# Patient Record
Sex: FEMALE | Race: WHITE | URBAN - METROPOLITAN AREA
[De-identification: names, ages, dates, MRNs, and addresses within clinical notes are randomized per-mention and may not be internally consistent; named-entity substitution may affect disease eponyms.]

---

## 2018-10-09 ENCOUNTER — OFFICE VISIT (OUTPATIENT)
Dept: URBAN - METROPOLITAN AREA CLINIC 76 | Facility: CLINIC | Age: 68
End: 2018-10-09
Payer: MEDICARE

## 2018-10-09 DIAGNOSIS — H02.056 TRICHIASIS W/O ENTROPION OF LEFT EYELID: ICD-10-CM

## 2018-10-09 PROCEDURE — 92014 COMPRE OPH EXAM EST PT 1/>: CPT | Performed by: OPTOMETRIST

## 2018-10-09 ASSESSMENT — VISUAL ACUITY
OS: 20/25
OD: 20/25

## 2018-10-09 ASSESSMENT — INTRAOCULAR PRESSURE
OD: 16
OS: 15

## 2018-10-09 NOTE — IMPRESSION/PLAN
Impression: Diagnosis: Age-related nuclear cataract, bilateral. Code: H25.13. Plan: Discussed condition. Continue to monitor without treatment at this time.

## 2018-10-09 NOTE — IMPRESSION/PLAN
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 Removed 2 lashes with forceps at slit lamp. Plan: Discussed. Continue to monitor.

## 2019-10-30 ENCOUNTER — OFFICE VISIT (OUTPATIENT)
Dept: URBAN - METROPOLITAN AREA CLINIC 76 | Facility: CLINIC | Age: 69
End: 2019-10-30
Payer: COMMERCIAL

## 2019-10-30 DIAGNOSIS — H25.13 NUCLEAR SCLEROSIS CATARACT, BILATERAL: ICD-10-CM

## 2019-10-30 DIAGNOSIS — H52.4 PRESBYOPIA: Primary | ICD-10-CM

## 2019-10-30 PROCEDURE — 92014 COMPRE OPH EXAM EST PT 1/>: CPT | Performed by: OPTOMETRIST

## 2019-10-30 ASSESSMENT — KERATOMETRY
OS: 44.13
OD: 44.00

## 2019-10-30 ASSESSMENT — VISUAL ACUITY
OD: 20/20
OS: 20/20

## 2019-10-30 ASSESSMENT — INTRAOCULAR PRESSURE
OS: 14
OD: 14

## 2021-10-26 ENCOUNTER — OFFICE VISIT (OUTPATIENT)
Dept: URBAN - METROPOLITAN AREA CLINIC 76 | Facility: CLINIC | Age: 71
End: 2021-10-26
Payer: COMMERCIAL

## 2021-10-26 DIAGNOSIS — H35.3121 NONEXUDATIVE AGE-RELATED MACULAR DEGENERATION OF LEFT EYE, EARLY DRY STAGE: ICD-10-CM

## 2021-10-26 DIAGNOSIS — H25.813 COMBINED FORMS OF AGE-RELATED CATARACT, BILATERAL: ICD-10-CM

## 2021-10-26 DIAGNOSIS — H43.813 VITREOUS DEGENERATION, BILATERAL: ICD-10-CM

## 2021-10-26 DIAGNOSIS — H04.123 DRY EYE SYNDROME OF BILATERAL LACRIMAL GLANDS: ICD-10-CM

## 2021-10-26 PROCEDURE — 92014 COMPRE OPH EXAM EST PT 1/>: CPT | Performed by: OPTOMETRIST

## 2021-10-26 ASSESSMENT — INTRAOCULAR PRESSURE
OS: 10
OD: 10

## 2021-10-26 ASSESSMENT — VISUAL ACUITY
OD: 20/20
OS: 20/25

## 2021-10-26 ASSESSMENT — KERATOMETRY
OS: 44.38
OD: 43.63

## 2021-10-26 NOTE — IMPRESSION/PLAN
Impression: Vitreous degeneration, bilateral: H43.813. Bilateral. Plan: Posterior vitreous detachment accounts for the patient's complaints. There is no evidence of associated retinal pathology. All signs and risks of retinal detachment and tears were discussed. Patient instructed to call the office immediately if any symptoms noted.

## 2021-10-26 NOTE — IMPRESSION/PLAN
Impression: Combined forms of age-related cataract, bilateral: H25.813. Bilateral. Plan: Cataracts affecting vision some, but no surgery is currently recommended. The patient will monitor vision changes and contact us with any decrease in vision. Continue to monitor.

## 2021-10-26 NOTE — IMPRESSION/PLAN
Impression: Nonexudative age-related macular degeneration of left eye, early dry stage: H35.3121. Left. Plan: AMD: Discussed condition in detail. AMD education given. Call if symptoms worsen. Will continue to monitor. Vista supplements ordered for pt.

## 2021-10-26 NOTE — IMPRESSION/PLAN
Impression: Presbyopia: H52.4 Bilateral. Plan: A glasses prescription has been discussed and generated. Patient to call with any concerns.

## 2021-10-26 NOTE — IMPRESSION/PLAN
Impression: Dry eye syndrome of bilateral lacrimal glands: H04.123. Bilateral. Plan: ZANE. Discussed dry eye disease. Use artificial tears up to QID. Call if worsens or no improvement.

## 2025-08-07 ENCOUNTER — OFFICE VISIT (OUTPATIENT)
Dept: URBAN - METROPOLITAN AREA CLINIC 76 | Facility: CLINIC | Age: 75
End: 2025-08-07
Payer: COMMERCIAL

## 2025-08-07 DIAGNOSIS — H52.4 PRESBYOPIA: Primary | ICD-10-CM

## 2025-08-07 DIAGNOSIS — H25.13 AGE-RELATED NUCLEAR CATARACT, BILATERAL: ICD-10-CM

## 2025-08-07 DIAGNOSIS — H43.813 VITREOUS DEGENERATION, BILATERAL: ICD-10-CM

## 2025-08-07 PROCEDURE — 92004 COMPRE OPH EXAM NEW PT 1/>: CPT | Performed by: OPTOMETRIST

## 2025-08-07 ASSESSMENT — VISUAL ACUITY
OD: 20/20
OS: 20/20

## 2025-08-07 ASSESSMENT — KERATOMETRY
OD: 44.00
OS: 44.38

## 2025-08-07 ASSESSMENT — INTRAOCULAR PRESSURE
OS: 10
OD: 10